# Patient Record
Sex: FEMALE
[De-identification: names, ages, dates, MRNs, and addresses within clinical notes are randomized per-mention and may not be internally consistent; named-entity substitution may affect disease eponyms.]

---

## 2019-02-21 ENCOUNTER — HOSPITAL ENCOUNTER (EMERGENCY)
Dept: HOSPITAL 31 - C.EROB | Age: 29
Discharge: HOME | End: 2019-02-21
Payer: COMMERCIAL

## 2019-02-21 VITALS
SYSTOLIC BLOOD PRESSURE: 100 MMHG | DIASTOLIC BLOOD PRESSURE: 66 MMHG | HEART RATE: 87 BPM | RESPIRATION RATE: 18 BRPM | TEMPERATURE: 97.6 F

## 2019-02-21 DIAGNOSIS — Z3A.37: ICD-10-CM

## 2019-02-21 DIAGNOSIS — O23.43: Primary | ICD-10-CM

## 2019-02-21 LAB
BACTERIA #/AREA URNS HPF: (no result) /[HPF]
BILIRUB UR-MCNC: NEGATIVE MG/DL
GLUCOSE UR STRIP-MCNC: NORMAL MG/DL
LEUKOCYTE ESTERASE UR-ACNC: (no result) LEU/UL
PH UR STRIP: 6 [PH] (ref 5–8)
PROT UR STRIP-MCNC: NEGATIVE MG/DL
RBC # UR STRIP: NEGATIVE /UL
SP GR UR STRIP: 1.01 (ref 1–1.03)
SQUAMOUS EPITHIAL: 2 /HPF (ref 0–5)
UROBILINOGEN UR-MCNC: NORMAL MG/DL (ref 0.2–1)

## 2019-02-21 NOTE — OBHP
===========================

Datetime: 2019 14:47

===========================

   

IP Adm Impression:  , intrauterine pregnancy; No Active Labor; Intact Membranes

IP Chief Complaint Other:  breech per OBGYN, BPP and NST requested

IP Admit Plan:  Discharge home

Admit Comment, IP Provider:  28 year old  at 37.6 wga by first trimester ultrasound presents t
o triage per request by her OBGYN physician due to his concerns for breech presentation requesting BP
P and NST. Patient is feeling fetal movement as usual, denies vaginal fluid, denies vaginal bleed. Gordon montes de oca does not feel abdominal tightness or pain. Patient's prenatal course has been overall unremarka
ble with the exception of pedal edema.

      

   Upon questioning, patient's  states she had an ultrasound today at [facility] and got the prescrip
tion from Dr. King to come to triage yesterday. Patient's  was told that the ultrasound was n
ormal. but report still pending. Patient feels fine today though has some baseline chronic fatigue fr
om her pregnancy. She denies nausea, vomiting, headache, diarrhea, constipation, dysuria, increased u
rinary frequency, chest pain, shortness of breath, palpitations.

      

   PMHx: hypothyroidism

   OBGYN hx: Dec 2016 Spontaneous miscarriage. Fetus just a few weeks old. No treatment done.

   PSHx: denies

   FHx: denies

   SocHx: Denies tobacco, EtOH and illicit drug use. 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 
employment prior to pregnancy.

   Medications: PNV and levothyroxine 50 mcg daily

   Allergies: NKDA

      

   Vitals: see above

   Physical exam: see above

   Labs: 

   -UA: 1+ leukocyte esterase with bacteria and WBC

      

   A/P:

   -37.6 week pregnancy with asymptomatic urinary tract infection: prescribed macrobid 100 mg PO BID 
x 7 days. 

   -reactive NST

   -BPP within normal limits per patient's 

   -no active labor upon examination

      

   Patient is to return to triage if she experiences vaginal fluid leakage, bleed, pain, decreased fe
latonya movement. Attending notified Dr King for plan of care upon her discharge.

      

   case discussed with Dr. Torres Mendez DO PGY1

      

   Pt seen and examined with Dr. Mendez and per Dr. King's request, and agree with all of her findings 
and POC.

Pelvic Type - PN:  Adequate

Extremities - PN:  Abnormal

Abdomen - PN:  Normal

Back - PN:  Normal

Breast - PN:  Not Done

Lungs - PN:  Normal

Heart - PN:  Normal

Thyroid - PN:  Normal

Neurologic - PN:  Normal

HEENT - PN:  Not Done

General - PN:  Normal

Fetal Presentation-Admit:  Breech

FHR - Baseline A Provider:  140

Membranes, Provider:  Intact

Contraction Comments Provider:  None

      

Comments, ACOG Physical Exam:  2+ pitting pedal edema

Gestation - Est Wks by US:  37.6

IP Prenatal Hx Assessment:  No Prenatal Care records available

EGA AdmitDate IP:  37.6

Vital Signs Provider:  Reviewed; Within Normal Limits

IP Chief Complaint:  Other

NICHD Variability Prov Fetus A:  Moderate 6-25bpm

NICHD Accel Fetus A IP Provider:  [id number]

FHR Category Provider Fetus A:  Category I

Dilatation, Provider:  0

Effacement, Provider:  0

Genitourinary Exam:  Normal

DTRs - PN:  Not Done

## 2019-02-21 NOTE — OBDCSUM
===========================

Datetime: 2019 14:51

===========================

   

Discharged to, Provider:  Home

Follow up at, Provider:  Dr king

Disch Instr Activity:  Normal activity

Disch Instr Diet:  Regular

Discharge Instructions, Provider:  Routine instructions given

Discharge Time:  2019 14:52

Follow up in weeks, Provider:  19

Disch Referrals:  None

Contraception discussed, Prov:  No

Disch Activity Restrictions:  No lifting; No sexual activity; Nothing in vagina - [place], tampon
s, douche

Discharge Comment, Provider:  28 year old  at 37.6 wga by first trimester ultrasound presents 
to triage per request by her OBGYN physician due to his concerns for breech presentation requesting B
PP and NST. Patient is feeling fetal movement as usual, denies vaginal fluid, denies vaginal bleed. P
atient does not feel abdominal tightness or pain. Patient's prenatal course has been overall unremark
able with the exception of pedal edema.

      

   Upon questioning, patient's  states she had an ultrasound today at [facility] and got the prescrip
tion from Dr. King to come to triage yesterday. Patient's  was told that the ultrasound was n
ormal. but report still pending. Patient feels fine today though has some baseline chronic fatigue fr
om her pregnancy. She denies nausea, vomiting, headache, diarrhea, constipation, dysuria, increased u
rinary frequency, chest pain, shortness of breath, palpitations.

      

   PMHx: hypothyroidism

   OBGYN hx: Dec 2016 Spontaneous miscarriage. Fetus just a few weeks old. No treatment done.

   PSHx: denies

   FHx: denies

   SocHx: Denies tobacco, EtOH and illicit drug use. 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 
employment prior to pregnancy.

   Medications: PNV and levothyroxine 50 mcg daily

   Allergies: NKDA

      

   Vitals: see above

   Physical exam: see above

   Labs: 

   -UA: 1+ leukocyte esterase with bacteria and WBC

      

   A/P:

   -37.6 week pregnancy with asymptomatic urinary tract infection: prescribed macrobid 100 mg PO BID 
x 7 days. 

   -reactive NST

   -BPP within normal limits per patient's 

   -no active labor upon examination

      

   Patient is to return to triage if she experiences vaginal fluid leakage, bleed, pain, decreased fe
latonya movement. Attending notified Dr King for plan of care upon her discharge.

   Discharged home in Satable and Satisfactory condition with instrucions and Rx for Macrobid

   Advised to increase po water intake.

      

   case discussed with Dr. Torres Mendez DO PGY1

      

   Pt seen and examined with Dr. Mendez and agree with her findings and POC

Discharge Diagnosis Prov Other:   pregnancy

   Breech Presentation

   Sent from Dr. King for NST